# Patient Record
Sex: MALE | Race: BLACK OR AFRICAN AMERICAN | HISPANIC OR LATINO | Employment: OTHER | URBAN - METROPOLITAN AREA
[De-identification: names, ages, dates, MRNs, and addresses within clinical notes are randomized per-mention and may not be internally consistent; named-entity substitution may affect disease eponyms.]

---

## 2024-07-27 ENCOUNTER — HOSPITAL ENCOUNTER (EMERGENCY)
Facility: HOSPITAL | Age: 54
Discharge: HOME | End: 2024-07-27
Attending: EMERGENCY MEDICINE
Payer: COMMERCIAL

## 2024-07-27 VITALS
SYSTOLIC BLOOD PRESSURE: 135 MMHG | RESPIRATION RATE: 18 BRPM | HEART RATE: 101 BPM | OXYGEN SATURATION: 98 % | DIASTOLIC BLOOD PRESSURE: 97 MMHG | TEMPERATURE: 96.8 F

## 2024-07-27 DIAGNOSIS — L23.7 POISON IVY DERMATITIS: Primary | ICD-10-CM

## 2024-07-27 PROCEDURE — 96372 THER/PROPH/DIAG INJ SC/IM: CPT

## 2024-07-27 PROCEDURE — 25010000002 DEXAMETHASONE SODIUM PHOSPHATE 10 MG/ML SOLUTION: Performed by: NURSE PRACTITIONER

## 2024-07-27 PROCEDURE — 99282 EMERGENCY DEPT VISIT SF MDM: CPT

## 2024-07-27 RX ORDER — PREDNISONE 10 MG/1
TABLET ORAL
Qty: 98 TABLET | Refills: 0 | Status: SHIPPED | OUTPATIENT
Start: 2024-07-27 | End: 2024-08-24

## 2024-07-27 RX ORDER — DEXAMETHASONE SODIUM PHOSPHATE 10 MG/ML
10 INJECTION, SOLUTION INTRAMUSCULAR; INTRAVENOUS ONCE
Status: COMPLETED | OUTPATIENT
Start: 2024-07-27 | End: 2024-07-27

## 2024-07-27 RX ADMIN — DEXAMETHASONE SODIUM PHOSPHATE 10 MG: 10 INJECTION, SOLUTION INTRAMUSCULAR; INTRAVENOUS at 05:27

## 2024-07-27 NOTE — ED PROVIDER NOTES
MD ATTESTATION NOTE    SHARED VISIT: This visit was performed by BOTH a physician and an APC. The substantive portion of the medical decision making was performed by this attesting physician who made or approved the management plan and takes responsibility for patient management. All studies documented in the APC note (if performed) were independently interpreted by me.    The LITA and I have discussed this patient's history, physical exam, MDM, and treatment plan.  I have reviewed the documentation and personally had a face to face interaction with the patient. The attached note describes my personal findings.      Jabier Echavarria is a 53 y.o. male who presents to the ED c/o acute rash.  Patient states he got into some poison ivy while doing yard work.  States he has itchy rash to bilateral upper extremities to his forehead and legs.  Has been present for about a week    On exam:  GENERAL: not distressed  HENT: nares patent  EYES: no scleral icterus  CV: regular rhythm, regular rate  RESPIRATORY: normal effort  ABDOMEN: soft  MUSCULOSKELETAL: no deformity  NEURO: alert, moves all extremities, follows commands  SKIN: warm, dry, raised blanchable rash to forearms forehead legs    Labs  No results found for this or any previous visit (from the past 24 hour(s)).    Radiology  No Radiology Exams Resulted Within Past 24 Hours    Medications given in the ED:  Medications   dexAMETHasone sodium phosphate injection 10 mg (10 mg Intramuscular Given 7/27/24 0527)       Orders placed during this visit:  No orders of the defined types were placed in this encounter.      Medical Decision Making:  ED Course as of 07/27/24 0722   Sat Jul 27, 2024   [id number] I discussed the case with Dr. Bradford and he agrees to evaluate the patient at the bedside.    [AR]   0522 The patient was reexamined.  They have had symptomatic improvement during their ED stay.  I discussed today's findings with the patient, explaining the pertinent positives and  negatives from today's visit, and the plan of care.  Discussed plan for discharge as there is no emergent indication for admission.  Discussed limitation of the ED work-up and that this is to rule out life-threatening emergencies but that they could require further testing as determined by their primary care and or any referred specialist patient is agreeable and understands need for follow-up and repeat exam/testing.  Patient is aware that discharge does not mean there is nothing wrong, indicates no emergency is present, and that they must continue their care with their primary care physician and/or any referred specialist.  They were given appropriate follow-up with their primary care physician and/or specialist.  I had an extensive discussion on the expected clinical course and return precautions.  Patient understands to return to the emergency department for continuation, worsening, or new symptoms.  I answered any of the patient's questions. Patient was discharged home in a stable condition.     [AR]      ED Course User Index  [AR] Krys Xiao APRN       Differential diagnosis:  Contact dermatitis, poison ivy, poison oak, cellulitis    Diagnosis  Final diagnoses:   Poison ivy dermatitis          Clay Bradford MD  07/27/24 0722

## 2024-07-27 NOTE — ED NOTES
Pt arrived to ER via pv from home, c/o of rash since Sunday, pt noticed rash on Monday and has worsened each day.

## 2024-07-27 NOTE — ED PROVIDER NOTES
" EMERGENCY DEPARTMENT ENCOUNTER  Room Number:  04/04  PCP: No primary care provider on file.  Independent Historians: {Historian:60338::\"Patient\"}      HPI:  Chief Complaint: had concerns including Rash (Rash since Sunday). ***    A complete HPI/ROS/PMH/PSH/SH/FH are unobtainable due to: {EM_Unobtainable History (Optional):28426::\"None\"}    Chronic or social conditions impacting patient care (Social Determinants of Health): {EM_SDOH (Optional):41588::\"None\"}      Context: Jabier Echavarria is a 53 y.o. male with a medical history of ***       Review of prior external notes (non-ED) -and- Review of prior external test results outside of this encounter:   Extensive review of the EPIC system as well as CareEverywhere reveals no prior visit notes and no prior diagnostic studies available for review. ***      PAST MEDICAL HISTORY  Active Ambulatory Problems     Diagnosis Date Noted    No Active Ambulatory Problems     Resolved Ambulatory Problems     Diagnosis Date Noted    No Resolved Ambulatory Problems     No Additional Past Medical History         PAST SURGICAL HISTORY  No past surgical history on file.      FAMILY HISTORY  No family history on file.      SOCIAL HISTORY         ALLERGIES  Lisinopril and Poison ivy extract      REVIEW OF SYSTEMS  Included in HPI  All systems reviewed and negative except for those discussed in HPI.      PHYSICAL EXAM    I have reviewed the triage vital signs and nursing notes.    ED Triage Vitals   Temp Heart Rate Resp BP SpO2   07/27/24 0423 07/27/24 0423 07/27/24 0423 07/27/24 0426 07/27/24 0423   96.8 °F (36 °C) 107 18 156/96 97 %      Temp src Heart Rate Source Patient Position BP Location FiO2 (%)   07/27/24 0423 -- 07/27/24 0423 07/27/24 0423 --   Tympanic  Sitting Right arm          LAB RESULTS  No results found for this or any previous visit (from the past 24 hour(s)).      RADIOLOGY  No Radiology Exams Resulted Within Past 24 Hours      MEDICATIONS GIVEN IN ER  Medications - No " data to display        OUTPATIENT MEDICATION MANAGEMENT:  No current Epic-ordered facility-administered medications on file.     No current [facility]-ordered outpatient medications on file.         PROCEDURES  Procedures        PROGRESS, DATA ANALYSIS, CONSULTS, AND MEDICAL DECISION MAKING  ORDERS PLACED DURING THIS VISIT:  No orders of the defined types were placed in this encounter.      All labs have been independently interpreted by me.  All radiology studies have been reviewed by me. All EKG's have been independently viewed by me.  Discussion below represents my analysis of pertinent findings related to patient's condition, differential diagnosis, treatment plan and final disposition.    Differential diagnosis includes but is not limited to:   ***    ED Course/Progress Notes/MDM:           AS OF 04:26 EDT VITALS:    BP - 156/96  HR - 107  TEMP - 96.8 °F (36 °C) (Tympanic)  O2 SATS - 97%    Clinical Scores:                  MDM:  ***      COMPLEXITY OF CARE  {Admission Statement:40794}    {EM_CC (Optional):18864}    DIAGNOSIS  Final diagnoses:   None         DISPOSITION  ED Disposition       None               {EM_PPE (Optional):59213}    Please note that portions of this document were completed with a voice recognition program.    Note Disclaimer: At [facility], we believe that sharing information builds trust and better relationships. You are receiving this note because you recently visited [facility]. It is possible you will see health information before a provider has talked with you about it. This kind of information can be easy to misunderstand. To help you fully understand what it means for your health, we urge you to discuss this note with your provider.